# Patient Record
Sex: FEMALE | Race: BLACK OR AFRICAN AMERICAN | NOT HISPANIC OR LATINO
[De-identification: names, ages, dates, MRNs, and addresses within clinical notes are randomized per-mention and may not be internally consistent; named-entity substitution may affect disease eponyms.]

---
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
## 2025-02-03 ENCOUNTER — HOSPITAL ENCOUNTER (EMERGENCY)
Facility: HOSPITAL | Age: 20
Discharge: HOME | End: 2025-02-03

## 2025-02-03 ENCOUNTER — APPOINTMENT (OUTPATIENT)
Dept: GENERAL RADIOLOGY | Facility: HOSPITAL | Age: 20
End: 2025-02-03
Attending: NURSE PRACTITIONER

## 2025-02-03 VITALS
RESPIRATION RATE: 16 BRPM | TEMPERATURE: 98 F | HEART RATE: 74 BPM | DIASTOLIC BLOOD PRESSURE: 85 MMHG | SYSTOLIC BLOOD PRESSURE: 123 MMHG | BODY MASS INDEX: 25.89 KG/M2 | WEIGHT: 120 LBS | HEIGHT: 57 IN | OXYGEN SATURATION: 99 %

## 2025-02-03 DIAGNOSIS — R42 DIZZINESS: ICD-10-CM

## 2025-02-03 DIAGNOSIS — R07.9 ACUTE CHEST PAIN: Primary | ICD-10-CM

## 2025-02-03 LAB
ANION GAP SERPL CALC-SCNC: 8 MMOL/L (ref 0–18)
B-HCG UR QL: NEGATIVE
BASOPHILS # BLD AUTO: 0.03 X10(3) UL (ref 0–0.2)
BASOPHILS NFR BLD AUTO: 0.4 %
BUN BLD-MCNC: 14 MG/DL (ref 9–23)
BUN/CREAT SERPL: 17.7 (ref 10–20)
CALCIUM BLD-MCNC: 9.1 MG/DL (ref 8.7–10.4)
CHLORIDE SERPL-SCNC: 106 MMOL/L (ref 98–112)
CO2 SERPL-SCNC: 24 MMOL/L (ref 21–32)
CREAT BLD-MCNC: 0.79 MG/DL
DEPRECATED RDW RBC AUTO: 37.5 FL (ref 35.1–46.3)
EGFRCR SERPLBLD CKD-EPI 2021: 109 ML/MIN/1.73M2 (ref 60–?)
EOSINOPHIL # BLD AUTO: 0.03 X10(3) UL (ref 0–0.7)
EOSINOPHIL NFR BLD AUTO: 0.4 %
ERYTHROCYTE [DISTWIDTH] IN BLOOD BY AUTOMATED COUNT: 12.3 % (ref 11–15)
GLUCOSE BLD-MCNC: 103 MG/DL (ref 70–99)
HCT VFR BLD AUTO: 37.6 %
HGB BLD-MCNC: 13.2 G/DL
IMM GRANULOCYTES # BLD AUTO: 0.01 X10(3) UL (ref 0–1)
IMM GRANULOCYTES NFR BLD: 0.1 %
LYMPHOCYTES # BLD AUTO: 2.22 X10(3) UL (ref 1–4)
LYMPHOCYTES NFR BLD AUTO: 27.2 %
MCH RBC QN AUTO: 29.3 PG (ref 26–34)
MCHC RBC AUTO-ENTMCNC: 35.1 G/DL (ref 31–37)
MCV RBC AUTO: 83.6 FL
MONOCYTES # BLD AUTO: 0.39 X10(3) UL (ref 0.1–1)
MONOCYTES NFR BLD AUTO: 4.8 %
NEUTROPHILS # BLD AUTO: 5.47 X10 (3) UL (ref 1.5–7.7)
NEUTROPHILS # BLD AUTO: 5.47 X10(3) UL (ref 1.5–7.7)
NEUTROPHILS NFR BLD AUTO: 67.1 %
OSMOLALITY SERPL CALC.SUM OF ELEC: 287 MOSM/KG (ref 275–295)
PLATELET # BLD AUTO: 182 10(3)UL (ref 150–450)
POTASSIUM SERPL-SCNC: 3.6 MMOL/L (ref 3.5–5.1)
RBC # BLD AUTO: 4.5 X10(6)UL
SODIUM SERPL-SCNC: 138 MMOL/L (ref 136–145)
TROPONIN I SERPL HS-MCNC: <3 NG/L
WBC # BLD AUTO: 8.2 X10(3) UL (ref 4–11)

## 2025-02-03 PROCEDURE — 99284 EMERGENCY DEPT VISIT MOD MDM: CPT

## 2025-02-03 PROCEDURE — 80048 BASIC METABOLIC PNL TOTAL CA: CPT | Performed by: NURSE PRACTITIONER

## 2025-02-03 PROCEDURE — 93005 ELECTROCARDIOGRAM TRACING: CPT

## 2025-02-03 PROCEDURE — 85025 COMPLETE CBC W/AUTO DIFF WBC: CPT | Performed by: NURSE PRACTITIONER

## 2025-02-03 PROCEDURE — 71045 X-RAY EXAM CHEST 1 VIEW: CPT | Performed by: NURSE PRACTITIONER

## 2025-02-03 PROCEDURE — 81025 URINE PREGNANCY TEST: CPT

## 2025-02-03 PROCEDURE — 84484 ASSAY OF TROPONIN QUANT: CPT | Performed by: NURSE PRACTITIONER

## 2025-02-03 PROCEDURE — 36415 COLL VENOUS BLD VENIPUNCTURE: CPT

## 2025-02-03 PROCEDURE — 93010 ELECTROCARDIOGRAM REPORT: CPT

## 2025-02-03 NOTE — ED INITIAL ASSESSMENT (HPI)
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 Patient is alert and oriented x4.

## 2025-02-04 LAB
ATRIAL RATE: 60 BPM
P AXIS: 67 DEGREES
P-R INTERVAL: 156 MS
Q-T INTERVAL: 392 MS
QRS DURATION: 80 MS
QTC CALCULATION (BEZET): 392 MS
R AXIS: 80 DEGREES
T AXIS: 58 DEGREES
VENTRICULAR RATE: 60 BPM

## 2025-02-04 NOTE — ED PROVIDER NOTES
Patient Seen in: [facility] Emergency Department      History     Chief Complaint   Patient presents with    Dizziness    Anxiety/Panic attack     Stated Complaint:     Subjective:   18yo/f w hx of asthma reports to the ED w c/o heavy chest pain, dyspnea. 2nd episode in 2 weeks. All while working. No fever. No chest pain. Had transient dizziness. No vomiting or diarrhea. No recent illness. No ocuhg or congestion. No concern for pregnancy.               Objective:     Past Medical History:    Asthma (HCC)              History reviewed. No pertinent surgical history.             Social History     Socioeconomic History    Marital status: Single   Tobacco Use    Smoking status: Never    Smokeless tobacco: Never   Vaping Use    Vaping status: Never Used   Substance and Sexual Activity    Alcohol use: Not Currently    Drug use: Never     Social Drivers of Health      Received from [facility]    Housing Stability                  Physical Exam     ED Triage Vitals [02/03/25 1706]   /85   Pulse 72   Resp 21   Temp 97.8 °F (36.6 °C)   Temp src Temporal   SpO2 99 %   O2 Device None (Room air)       Current Vitals:   Vital Signs  BP: 123/85  Pulse: 72  Resp: 21  Temp: 97.8 °F (36.6 °C)  Temp src: Temporal  MAP (mmHg): 92    Oxygen Therapy  SpO2: 99 %  O2 Device: None (Room air)        Physical Exam  Vitals and nursing note reviewed.   Constitutional:       General: She is not in acute distress.     Appearance: She is well-developed.   HENT:      Head: Normocephalic and atraumatic.      Nose: Nose normal.      Mouth/Throat:      Mouth: Mucous membranes are moist.   Eyes:      Conjunctiva/sclera: Conjunctivae normal.      Pupils: Pupils are equal, round, and reactive to light.   Cardiovascular:      Rate and Rhythm: Normal rate and regular rhythm.      Heart sounds: Normal heart sounds.   Pulmonary:      Effort: Pulmonary effort is normal.      Breath sounds: Normal breath sounds.   Abdominal:       General: Bowel sounds are normal.      Palpations: Abdomen is soft.   Musculoskeletal:         General: No tenderness or deformity. Normal range of motion.      Cervical back: Normal range of motion and neck supple.   Skin:     General: Skin is warm and dry.      Capillary Refill: Capillary refill takes less than 2 seconds.      Findings: No rash.      Comments: Normal color   Neurological:      General: No focal deficit present.      Mental Status: She is alert and oriented to person, place, and time.      GCS: GCS eye subscore is 4. GCS verbal subscore is 5. GCS motor subscore is 6.      Cranial Nerves: No cranial nerve deficit.      Gait: Gait normal.             ED Course     Labs Reviewed   BASIC METABOLIC PANEL (8) - Abnormal; Notable for the following components:       Result Value    Glucose 103 (*)     All other components within normal limits   TROPONIN I HIGH SENSITIVITY - Normal   POCT PREGNANCY URINE - Normal   CBC WITH DIFFERENTIAL WITH PLATELET     EKG    Rate, intervals and axes as noted on EKG Report.  Rate: 60   Rhythm: Sinus Rhythm  Reading: Nsr no montserrat no ectopy normal axis  Stable clinical condition  No comparison                     FINDINGS:  CARDIAC/VASC: Heart size and pulmonary vascularity normal.  MEDIAST/TATO:   No visible mass or adenopathy.  LUNGS/PLEURA: No consolidation or pleural effusion.  BONES: No fracture or visible bony lesion.  Mild scoliosis.  OTHER: Negative.                Impression  CONCLUSION:  1. No acute cardiopulmonary finding.           Dictated by (CST): David Rankin MD on 2/03/2025 at 7:37 PM      Finalized by (CST): David Rankin MD on 2/03/2025 at 7:38 PM         MDM              Medical Decision Making  18yo/f w hx and exam as stated; dyspnea, dizziness, cp    Lungs ctab  Cxr wnl  Trop neg  Labs non acute  EKG non acute  No vomiting  No weakness    Plan  Dc to home  Close fu      Amount and/or Complexity of Data Reviewed  Labs:  Decision-making details  documented in ED Course.  Radiology:  Decision-making details documented in ED Course.  ECG/medicine tests:  Decision-making details documented in ED Course.    Risk  OTC drugs.  Prescription drug management.        Disposition and Plan     Clinical Impression:  1. Acute chest pain    2. Dizziness         Disposition:  Discharge  2/3/2025  7:41 pm    Follow-up:  Tejinder Au  [address] [facility] [address]  [phone]    Follow up in 2 day(s)            Medications Prescribed:  There are no discharge medications for this patient.          Supplementary Documentation:

## (undated) NOTE — ED AVS SNAPSHOT
Parent/Legal Guardian Access to the Online [employer] Record of a Patient 15to 16Years Old  Return completed form by Secure email to [place] HIM/Medical Records Department: [email or web address]     Requirements and Procedures   Under [facility] MyChart ID and password with another person, that person may be able to view my or my child’s health information, and health information about someone who has authorized me as a MyChart proxy.    ·  I agree that it is my responsibility to select a confident Sign-Up Form and I agree to its terms.        Authorization Form     Please enter Patient’s information below:   Name (last, first, middle initial) __________________________________________   Gender  Male  Female    Last 4 Digits of Social Security Number Parent/Legal Guardian Signature                                  For Patient (1517 years of age)  I agree to allow my parent/legal guardian, named above, online access to my medical information currently available and that may become available as a result

## (undated) NOTE — ED AVS SNAPSHOT
Parent/Legal Guardian Access to the Online [employer] Record of a Patient 15to 16Years Old  Return completed form by Secure email to [place] HIM/Medical Records Department: [email or web address]     Requirements and Procedures   Under [facility] MyChart ID and password with another person, that person may be able to view my or my child’s health information, and health information about someone who has authorized me as a MyChart proxy.    ·  I agree that it is my responsibility to select a confident Sign-Up Form and I agree to its terms.        Authorization Form     Please enter Patient’s information below:   Name (last, first, middle initial) __________________________________________   Gender  Male  Female    Last 4 Digits of Social Security Number Parent/Legal Guardian Signature                                  For Patient (1517 years of age)  I agree to allow my parent/legal guardian, named above, online access to my medical information currently available and that may become available as a result